# Patient Record
Sex: FEMALE | Employment: FULL TIME | URBAN - METROPOLITAN AREA
[De-identification: names, ages, dates, MRNs, and addresses within clinical notes are randomized per-mention and may not be internally consistent; named-entity substitution may affect disease eponyms.]

---

## 2017-05-08 ENCOUNTER — HOSPITAL ENCOUNTER (OUTPATIENT)
Dept: GENERAL RADIOLOGY | Age: 47
Discharge: HOME | End: 2017-05-08
Payer: COMMERCIAL

## 2017-05-08 DIAGNOSIS — R07.89 TIGHTNESS IN CHEST: ICD-10-CM

## 2017-05-08 PROCEDURE — 71020 XR CHEST PA LAT: CPT

## 2020-01-15 ENCOUNTER — HOSPITAL ENCOUNTER (OUTPATIENT)
Dept: PHYSICAL THERAPY | Age: 50
Discharge: HOME | End: 2020-01-15
Payer: COMMERCIAL

## 2020-01-15 PROCEDURE — 97112 NEUROMUSCULAR REEDUCATION: CPT | Performed by: PHYSICAL THERAPIST

## 2020-01-15 PROCEDURE — 97110 THERAPEUTIC EXERCISES: CPT | Performed by: PHYSICAL THERAPIST

## 2020-01-15 PROCEDURE — 97162 PT EVAL MOD COMPLEX 30 MIN: CPT | Performed by: PHYSICAL THERAPIST

## 2020-01-15 NOTE — PROGRESS NOTES
[address] [place] [address], [address]  Phone: [phone]  Fax: [phone]    Plan of Care/ Statement of Necessity for Physical Therapy Services 2-15    Patient name: Sonya Álvarez  : 1970  Provider#: [id number]  Referral source: Shyay Melton MD      Medical/Treatment Diagnosis: Pelvic floor dysfunction [M62.89]     Prior Hospitalization: see medical history     Comorbidities: see eval  Prior Level of Function: see eval  Medications: Verified on Patient Summary List    Start of Care: 1/15/20      Onset Date: 2 years       The Plan of Care and following information is based on the information from the initial evaluation. Assessment/ key information: Patient referred to pelvic PT for chronic constipation and AIMEE. She presents with overactivity of the PFM with poor coordination and motor awareness of PF and TrA. Patient instructed in the role of physical therapy in the evaluation and treatment of pelvic floor dysfunction, applicable treatment modalities discussed. Expectations for regular home exercise participation and expected visit frequency in to achieve the patient's goals were discussed. Patient instructed in pelvic floor anatomy and function including levator ani, puborectalis and superficial pelvic  musculature. Patient was provided dietary information to improve stool quality including increasing soluble fiber intake and increased water intake (6-8 glasses a day). Pt instructed in use of [place] stool chart to track progress. Patient provided information on bladder diary completion, will collect intake/measured output data for 72 hours and return for analysis. Patient educated in urinary urge suppression techniques including the KNACK, quick flicks, calmly walking rather than rushing to the toilet, nervous system down training.      Patient educated in proper defecation posture and technique including use of Squatty Potty for better puborectalis ms relaxation. Pt to avoid straining to pass stool in order to decrease strain on pelvic floor. Evaluation Complexity History MEDIUM  Complexity : 1-2 comorbidities / personal factors will impact the outcome/ POC ; Examination MEDIUM Complexity : 3 Standardized tests and measures addressing body structure, function, activity limitation and / or participation in recreation  ;Presentation MEDIUM Complexity : Evolving with changing characteristics  ; Clinical Decision Making MEDIUM Complexity : FOTO score of 26-74  Overall Complexity Rating: MEDIUM    Problem List: pain affecting function, decrease ROM, decrease strength, decrease ADL/ functional abilitiies, decrease activity tolerance and decrease flexibility/ joint mobility   Treatment Plan may include any combination of the following: Therapeutic exercise, Therapeutic activities, Neuromuscular re-education, Physical agent/modality, Manual therapy, Patient education, Self Care training and Functional mobility training  Patient / Family readiness to learn indicated by: asking questions, trying to perform skills and interest  Persons(s) to be included in education: patient (P)  Barriers to Learning/Limitations: None  Patient Goal (s): to reduce UI and constipation  Patient Self Reported Health Status: good  Rehabilitation Potential: good    Short Term Goals: To be accomplished in 6 weeks:  Patient will be independent with a progressive home exercise program    Patient will demonstrate & utilized pelvic floor ms protection techniques   Patient will demonstrate improved PFM strength to 3+/5 bilaterally in order to decrease UI symptoms   Patient will be independent with urge suppression techniques, bladder irritant elmination   Patient will complete a 72 hour bladder diary for analysis  Patient will be independent with progressive body scan relaxation program     Long Term Goals:  To be accomplished in 12 weeks:  Patient will demonstrate 4/5 PFM strength bilaterally in order to eliminate UI symptoms. Patient will demonstrate 10 second PFM holds at 4/5 in order to elminate UI symptoms. Patient will have no loss of urine with cough/sneeze. Patient will have no loss of urine with urge triggers (key in door, pulling pants down)  Patient will demonstrated improved transit time and complete emptying of rectum with BM    Frequency / Duration: Patient to be seen 1 times per week for 12 weeks. Patient/ Caregiver education and instruction: self care, activity modification and exercises    [x]  Plan of care has been reviewed with CLAUDIA Velazquez, PT 1/15/2020     ________________________________________________________________________    I certify that the above Therapy Services are being furnished while the patient is under my care. I agree with the treatment plan and certify that this therapy is necessary.     [employer] Signature:____________________  Date:____________Time: _________

## 2020-01-15 NOTE — PROGRESS NOTES
PT INITIAL EVALUATION NOTE -15    Patient Name: Km Baires  Date:1/15/2020  : 1970  [x]  Patient  Verified  Payor: BLUE CROSS / Plan: [employer] [id number] PPO / Product Type: PPO /    In time:0400  Out time:0500  Total Treatment Time (min): 60  Visit #: 1     Treatment Area: Pelvic floor dysfunction [M62.89]    SUBJECTIVE  Pain Level (0-10 scale): 0  Any medication changes, allergies to medications, adverse drug reactions, diagnosis change, or new procedure performed?: [] No    [x] Yes (see summary sheet for update)  Subjective:     Patient has been seeing Dr. Latisha Perez for chronic constipation/diarhear. She is taking Linzess and Miralax for constipation and it turned into more diarhea. Has d/c miralax. Tried to take an antibiotic for possible infection but did not tolerate well and d/c 1 week ago. Patient is now having a BM every other day with type 3 stool  She reports delayed and strong/servere urge for BM without FI. About 3 years ago, she was dx with DM (able to undo with diet and exercise). Breakfast: egg, meat, veggie. Lunch and Dinner: veggies and protein. Net carbs 90g. Drinking 40 oz. Her fiber intake is >30g and mostly insoluble. PMH: includes endometriosis with infiltration into bowels. Has had 2 laproscopic surgerys to treat and removal of 1 fallopian tube in past 10 years. Currently taking lo-estrogen BCP to prevent cycle and manage Sx. Patient reports that she has some urinary urgency and irritble bladder Sx. 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 Wears a panty liner which is wet by the end of the day.     PLOF: see above  Mechanism of Injury: gradual  Previous Treatment/Compliance: colonoscopy  PMHx/Surgical Hx: see above  Work Hx: special  K-[employer]  Living Situation: lives with   Pt Goals: to reduce constipation and UUI  Barriers: none  Motivation: yes  Substance use: none   FABQ Score: -  Cognition: A & O x 4 OBJECTIVE/EXAMINATION    External Pelvic Exam  Non tender through external pelvic clock  No POP at rest or with sustained valsalva  Active contraction: absent  Active relaxation: absent  Involuntary relaxation: absent    Internal Vaginal Exam:  Layer 1: wnl  Layer 2/3: wnl  Bladder neck mobility:wnl    PERFECT SCORE CHART  P =  Power (Laycock Scale Grade 0-5) 2/5  E =  Endurance (How long pt holds max contraction) 1sec  R =  Repetitions (How many times the repeats holds) 3x  F =  Fast Twitch (How many 1 second contractions in 10 seconds) unable to recruit in 10s  E =  Elevation (Lift of post vaginal wall toward pubic bone) absent  C =  Coordinated cocontraction of transverse abdominus absent  T = Timing (squeeze and lift with cough) absent    SEMG: resting tone: 8mV      15 min Therapeutic Exercise:  [x] See flow sheet :   Rationale: increase ROM, increase strength and improve coordination to improve the patients ability to perform ADLs       15 min Neuromuscular Re-education:  [x]  See flow sheet :sEMG biofeedback for pelvic awareness and down training   Rationale: increase ROM, increase strength, improve coordination and increase proprioception  to improve the patients ability to perform ADLs and reduce UI      With   [] TE   [] TA   [] neuro   [] other: Patient Education: [x] Review HEP    [] Progressed/Changed HEP based on:   [] positioning   [] body mechanics   [] transfers   [] heat/ice application    [] other:        Other Objective/Functional Measures:see above    Pain Level (0-10 scale) post treatment: 0      ASSESSMENT:      [x]  See Plan of Care      Ammy Chong, PT 1/15/2020

## 2020-02-05 ENCOUNTER — HOSPITAL ENCOUNTER (OUTPATIENT)
Dept: PHYSICAL THERAPY | Age: 50
Discharge: HOME | End: 2020-02-05
Payer: COMMERCIAL

## 2020-02-05 PROCEDURE — 97110 THERAPEUTIC EXERCISES: CPT | Performed by: PHYSICAL THERAPIST

## 2020-02-05 PROCEDURE — 97112 NEUROMUSCULAR REEDUCATION: CPT | Performed by: PHYSICAL THERAPIST

## 2020-02-05 NOTE — PROGRESS NOTES
PT DAILY TREATMENT NOTE 2-15    Patient Name: Zo Reyes  Date:2020  : 1970  [x]  Patient  Verified  Payor: BLUE CROSS / Plan: [employer] [id number] PPO / Product Type: PPO /    In time:0500  Out time:0600  Total Treatment Time (min): 60  Visit #: 2 Visit count could not be calculated. Make sure you are using a visit which is associated with an episode. Treatment Area: Pelvic floor dysfunction [M62.89]    SUBJECTIVE  Pain Level (0-10 scale): 0  Any medication changes, allergies to medications, adverse drug reactions, diagnosis change, or new procedure performed?: [x] No    [] Yes (see summary sheet for update)  Subjective functional status/changes:   [] No changes reported  BM have become less incidents of diarrhea. Having some constipation. Trying to figure out balance with fiber and water. Using squatty potty. Not drinking water.     OBJECTIVE          30 min Therapeutic Exercise:  [] See flow sheet :overflow, breathing   Rationale: increase ROM, increase strength and improve coordination to improve the patients ability to decrease urge         30 min Neuromuscular Re-education:  [x]  See flow sheet :sEMG, elevators, quicks, bulging, coodination drills   Rationale: increase ROM, increase strength and improve coordination  to improve the patients ability to decrease urge and improve emptying      With   [] TE   [] TA   [] neuro   [] other: Patient Education: [x] Review HEP    [] Progressed/Changed HEP based on:   [] positioning   [] body mechanics   [] transfers   [] heat/ice application    [] other:      Other Objective/Functional Measures: Resting tone 5-6mV initial    During Tx; <5mV rest, 18mV max  Demonstrates improved recruitment and derecruitment with Verbal and Visual Cues       Pain Level (0-10 scale) post treatment: 0    ASSESSMENT/Changes in Function:     Patient will continue to benefit from skilled PT services to modify and progress therapeutic interventions, address functional mobility deficits, address ROM deficits, address strength deficits, analyze and address soft tissue restrictions, analyze and cue movement patterns and analyze and modify body mechanics/ergonomics to attain remaining goals. []  See Plan of Care  []  See progress note/recertification  []  See Discharge Summary         Progress towards goals / Updated goals:  Demonstrates good potential to meet all goals.     PLAN  [x]  Upgrade activities as tolerated     []  Continue plan of care  []  Update interventions per flow sheet       []  Discharge due to:_  []  Other:_      Khanh Deras, PT 2/5/2020

## 2020-02-12 ENCOUNTER — APPOINTMENT (OUTPATIENT)
Dept: PHYSICAL THERAPY | Age: 50
End: 2020-02-12
Payer: COMMERCIAL

## 2020-02-19 ENCOUNTER — HOSPITAL ENCOUNTER (OUTPATIENT)
Dept: PHYSICAL THERAPY | Age: 50
Discharge: HOME | End: 2020-02-19
Payer: COMMERCIAL

## 2020-02-19 PROCEDURE — 97110 THERAPEUTIC EXERCISES: CPT | Performed by: PHYSICAL THERAPIST

## 2020-02-19 PROCEDURE — 97112 NEUROMUSCULAR REEDUCATION: CPT | Performed by: PHYSICAL THERAPIST

## 2020-02-19 NOTE — PROGRESS NOTES
PT DAILY TREATMENT NOTE 2-15    Patient Name: Tracee Lyn  Date:2020  : 1970  [x]  Patient  Verified  Payor: BLUE CROSS / Plan: [employer] [id number] PPO / Product Type: PPO /    In time:0500  Out time:0600  Total Treatment Time (min): 60  Visit #: 3  Treatment Area: Pelvic floor dysfunction [M62.89]    SUBJECTIVE  Pain Level (0-10 scale): 0  Any medication changes, allergies to medications, adverse drug reactions, diagnosis change, or new procedure performed?: [x] No    [] Yes (see summary sheet for update)  Subjective functional status/changes:   [] No changes reported  Patient reports that she is still having trouble evacuating stool because she has the urge to go at school, and does not have access to squatty potty. She is having better bowel movement stool consistency (less diarrhea).      OBJECTIVE          30 min Therapeutic Exercise:  [] See flow sheet :overflow, breathing   Rationale: increase ROM, increase strength and improve coordination to improve the patients ability to decrease urge         30 min Neuromuscular Re-education:  [x]  See flow sheet :sEMG, elevators, quicks, bulging, coodination drills   Rationale: increase ROM, increase strength and improve coordination  to improve the patients ability to decrease urge and improve emptying      With   [] TE   [] TA   [] neuro   [] other: Patient Education: [x] Review HEP    [] Progressed/Changed HEP based on:   [] positioning   [] body mechanics   [] transfers   [] heat/ice application    [] other:      Other Objective/Functional Measures: Resting tone 3-5mV (normal)    During Tx; max: 15mV  Demonstrates improved recruitment and derecruitment with Verbal and Visual Cues, progressed with TrA stability for improved Sx management for Endo       Pain Level (0-10 scale) post treatment: 0    ASSESSMENT/Changes in Function:     Patient will continue to benefit from skilled PT services to modify and progress therapeutic interventions, address functional mobility deficits, address ROM deficits, address strength deficits, analyze and address soft tissue restrictions, analyze and cue movement patterns and analyze and modify body mechanics/ergonomics to attain remaining goals. []  See Plan of Care  []  See progress note/recertification  []  See Discharge Summary         Progress towards goals / Updated goals:  Demonstrates good potential to meet all goals.     PLAN  [x]  Upgrade activities as tolerated     []  Continue plan of care  []  Update interventions per flow sheet       []  Discharge due to:_  []  Other:_      William Snell, PT 2/19/2020

## 2020-02-26 ENCOUNTER — HOSPITAL ENCOUNTER (OUTPATIENT)
Dept: PHYSICAL THERAPY | Age: 50
Discharge: HOME | End: 2020-02-26
Payer: COMMERCIAL

## 2020-02-26 PROCEDURE — 97112 NEUROMUSCULAR REEDUCATION: CPT | Performed by: PHYSICAL THERAPIST

## 2020-02-26 PROCEDURE — 97110 THERAPEUTIC EXERCISES: CPT | Performed by: PHYSICAL THERAPIST

## 2020-02-26 NOTE — PROGRESS NOTES
PT DAILY TREATMENT NOTE 2-15    Patient Name: Julian Began  Date:2020  : 1970  [x]  Patient  Verified  Payor: BLUE SHARONA / Plan: [employer] [id number] PPO / Product Type: PPO /    In time:0500  Out time:0600  Total Treatment Time (min): 60  Visit #: 4  Treatment Area: Pelvic floor dysfunction [M62.89]    SUBJECTIVE  Pain Level (0-10 scale): 0  Any medication changes, allergies to medications, adverse drug reactions, diagnosis change, or new procedure performed?: [x] No    [] Yes (see summary sheet for update)  Subjective functional status/changes:   [] No changes reported  Patient reports that she added cheese back into diet and has not been increasing soluble fiber. She is having more frequent BM's and loose stool. Feels gassy and irritable. Even feels like she might be getting a UTI. OBJECTIVE          30 min Therapeutic Exercise:  [] See flow sheet :overflow, breathing   Rationale: increase ROM, increase strength and improve coordination to improve the patients ability to decrease urge         30 min Neuromuscular Re-education:  [x]  See flow sheet :sEMG, elevators, quicks, bulging, coodination drills   Rationale: increase ROM, increase strength and improve coordination  to improve the patients ability to decrease urge and improve emptying      With   [] TE   [] TA   [] neuro   [] other: Patient Education: [x] Review HEP    [] Progressed/Changed HEP based on:   [] positioning   [] body mechanics   [] transfers   [] heat/ice application    [] other:      Other Objective/Functional Measures: Resting tone 3-5mV (normal)    Patient demonstrates difficulty recruiting levator ani today without overflow technique. Max: 9mv       Pain Level (0-10 scale) post treatment: 0    ASSESSMENT/Changes in Function:    discussed importance of soluble fiber and stool quality. Also reiterated importance of drinking water and allowing time to have BM and not to ignore BM.       Patient will continue to benefit from skilled PT services to modify and progress therapeutic interventions, address functional mobility deficits, address ROM deficits, address strength deficits, analyze and address soft tissue restrictions, analyze and cue movement patterns and analyze and modify body mechanics/ergonomics to attain remaining goals. []  See Plan of Care  []  See progress note/recertification  []  See Discharge Summary         Progress towards goals / Updated goals:  Demonstrates good potential to meet all goals.     PLAN  [x]  Upgrade activities as tolerated     []  Continue plan of care  []  Update interventions per flow sheet       []  Discharge due to:_  []  Other:_      Gray Oconnor, PT 2/26/2020

## 2020-03-04 ENCOUNTER — HOSPITAL ENCOUNTER (OUTPATIENT)
Dept: PHYSICAL THERAPY | Age: 50
Discharge: HOME | End: 2020-03-04
Payer: COMMERCIAL

## 2020-03-04 PROCEDURE — 97112 NEUROMUSCULAR REEDUCATION: CPT | Performed by: PHYSICAL THERAPIST

## 2020-03-04 PROCEDURE — 97110 THERAPEUTIC EXERCISES: CPT | Performed by: PHYSICAL THERAPIST

## 2020-03-04 NOTE — PROGRESS NOTES
PT DAILY TREATMENT NOTE 2-15    Patient Name: Yaz Martin  Date:3/4/2020  : 1970  [x]  Patient  Verified  Payor: BLUE CROSS / Plan: [facility] PPO / Product Type: PPO /    In time:0600  Out time:0700  Total Treatment Time (min): 60  Visit #: 5  Treatment Area: Pelvic floor dysfunction [M62.89]    SUBJECTIVE  Pain Level (0-10 scale): 0  Any medication changes, allergies to medications, adverse drug reactions, diagnosis change, or new procedure performed?: [x] No    [] Yes (see summary sheet for update)  Subjective functional status/changes:   [] No changes reported  Patient reports that has taken cheese out of her diet, but not sure if it even makes a difference. Still having inconsistent BMs. Even when they are soft and formed, she reports pain from anal fissure. She admits that she still breath holds to quickly evacuate due to pain. Using squatty potty. OBJECTIVE          30 min Therapeutic Exercise:  [] See flow sheet :overflow, breathing   Rationale: increase ROM, increase strength and improve coordination to improve the patients ability to decrease urge         30 min Neuromuscular Re-education:  [x]  See flow sheet :sEMG, elevators, quicks, bulging, coodination drills   Rationale: increase ROM, increase strength and improve coordination  to improve the patients ability to decrease urge and improve emptying      With   [] TE   [] TA   [] neuro   [] other: Patient Education: [x] Review HEP    [] Progressed/Changed HEP based on:   [] positioning   [] body mechanics   [] transfers   [] heat/ice application    [] other:      Other Objective/Functional Measures:   SEMG:3-5mV  Improved recruitment: Max 15mV    Focused on downtraining and breathing for painfree BM       Pain Level (0-10 scale) post treatment: 0    ASSESSMENT/Changes in Function:   Discussed the possibilities of scar adhesions in bowels secondary to endometriosis.   Patient will continue to benefit from abdominal mobilization, biofeedback and down training to decrease pain leading to her constipation. Patient agreed to add more water in her diet now that she has made improvements in her PFM coordination and can successfully perform urge suppression. Patient will continue to benefit from skilled PT services to modify and progress therapeutic interventions, address functional mobility deficits, address ROM deficits, address strength deficits, analyze and address soft tissue restrictions, analyze and cue movement patterns and analyze and modify body mechanics/ergonomics to attain remaining goals. []  See Plan of Care  []  See progress note/recertification  []  See Discharge Summary         Progress towards goals / Updated goals:  Demonstrates good potential to meet all goals.     PLAN  [x]  Upgrade activities as tolerated     []  Continue plan of care  []  Update interventions per flow sheet       []  Discharge due to:_  []  Other:_      Lucia Ko, PT 3/4/2020

## 2020-03-11 ENCOUNTER — HOSPITAL ENCOUNTER (OUTPATIENT)
Dept: PHYSICAL THERAPY | Age: 50
Discharge: HOME | End: 2020-03-11
Payer: COMMERCIAL

## 2020-03-11 PROCEDURE — 97110 THERAPEUTIC EXERCISES: CPT | Performed by: PHYSICAL THERAPIST

## 2020-03-11 PROCEDURE — 97140 MANUAL THERAPY 1/> REGIONS: CPT | Performed by: PHYSICAL THERAPIST

## 2020-03-11 PROCEDURE — 97112 NEUROMUSCULAR REEDUCATION: CPT | Performed by: PHYSICAL THERAPIST

## 2020-03-11 NOTE — PROGRESS NOTES
PT DAILY TREATMENT NOTE 2-15    Patient Name: Sonya Álvarez  Date:3/11/2020  : 1970  [x]  Patient  Verified  Payor: BLUE CROSS / Plan: [employer] [id number] PPO / Product Type: PPO /    In time:0400  Out time:0500  Total Treatment Time (min): 60  Visit #: 6  Treatment Area: Pelvic floor dysfunction [M62.89]    SUBJECTIVE  Pain Level (0-10 scale): 0  Any medication changes, allergies to medications, adverse drug reactions, diagnosis change, or new procedure performed?: [x] No    [] Yes (see summary sheet for update)  Subjective functional status/changes:   [] No changes reported    Patient reports that her symptoms have stayed steady over the past week. Still struggles to initiate BM even when type 4 stool and reports that she feels the need to breath hold and push. OBJECTIVE          30 min Therapeutic Exercise:  [] See flow sheet :overflow, breathing, with manaul OP for rib excursion and visceral mobility   Rationale: increase ROM, increase strength and improve coordination to improve the patients ability to decrease urge         30 min Neuromuscular Re-education:  [x]  See flow sheet :TrA progression   Rationale: increase ROM, increase strength and improve coordination  to improve the patients ability to decrease urge and improve emptying      With   [] TE   [] TA   [] neuro   [] other: Patient Education: [x] Review HEP    [] Progressed/Changed HEP based on:   [] positioning   [] body mechanics   [] transfers   [] heat/ice application    [] other:      Other Objective/Functional Measures:   Reviewed toileting techniques with breath mechanics and bulging. Tolerated visceral mobilization well today. Pain Level (0-10 scale) post treatment: 0    ASSESSMENT/Changes in Function:     Reviewed lumbosacral stabilization well today. Requires manual cues for correct breathing and rib excursion.      Patient will continue to benefit from skilled PT services to modify and progress therapeutic interventions, address functional mobility deficits, address ROM deficits, address strength deficits, analyze and address soft tissue restrictions, analyze and cue movement patterns and analyze and modify body mechanics/ergonomics to attain remaining goals. []  See Plan of Care  []  See progress note/recertification  []  See Discharge Summary         Progress towards goals / Updated goals:  Demonstrates good potential to meet all goals.     PLAN  [x]  Upgrade activities as tolerated     []  Continue plan of care  []  Update interventions per flow sheet       []  Discharge due to:_  []  Other:_      Ammy Smoke, PT 3/11/2020

## 2020-03-18 ENCOUNTER — HOSPITAL ENCOUNTER (OUTPATIENT)
Dept: PHYSICAL THERAPY | Age: 50
Discharge: HOME | End: 2020-03-18
Payer: COMMERCIAL

## 2020-03-18 PROCEDURE — 97014 ELECTRIC STIMULATION THERAPY: CPT | Performed by: PHYSICAL THERAPIST

## 2020-03-18 PROCEDURE — 97112 NEUROMUSCULAR REEDUCATION: CPT | Performed by: PHYSICAL THERAPIST

## 2020-03-18 PROCEDURE — 97110 THERAPEUTIC EXERCISES: CPT | Performed by: PHYSICAL THERAPIST

## 2020-03-18 NOTE — PROGRESS NOTES
PT DAILY TREATMENT NOTE 2-15    Patient Name: Julian Began  Date:3/18/2020  : 1970  [x]  Patient  Verified  Payor: BLUE CROSS / Plan: [facility] PPO / Product Type: PPO /    In time:0400  Out time:0500  Total Treatment Time (min): 60  Visit #: 7  Treatment Area: Pelvic floor dysfunction [M62.89]    SUBJECTIVE  Pain Level (0-10 scale): 0  Any medication changes, allergies to medications, adverse drug reactions, diagnosis change, or new procedure performed?: [x] No    [] Yes (see summary sheet for update)  Subjective functional status/changes:   [] No changes reported    Patient reports that she is going to try to drink more water over the next month and practice urge suppression since she will be out of work    OBJECTIVE      Modality rationale: decrease inflammation, decrease pain and increase tissue extensibility to improve the patients ability to perform ADls   Min Type Additional Details   15 [x] Estim: []Att   []Unatt        []TENS instruct                  [x]IFC  []Premod   []NMES                     []Other:  []w/US   []w/ice   []w/heat  Position:supine  Location:abdomen    []  Traction: [] Cervical       []Lumbar                       [] Prone          []Supine                       []Intermittent   []Continuous Lbs:  [] before manual  [] after manual  []w/heat    []  Ultrasound: []Continuous   [] Pulsed at:                            []1MHz   []3MHz Location:  W/cm2:    []  Paraffin         Location:  []w/heat    []  Ice     []  Heat  []  Ice massage Position:  Location:    []  Laser  []  Other: Position:  Location:    []  Vasopneumatic Device Pressure:       [] lo [] med [] hi   Temperature:    [x] Skin assessment post-treatment:  [x]intact []redness- no adverse reaction    []redness  adverse reaction:     30 min Therapeutic Exercise:  [] See flow sheet :overflow, breathing, with manaul OP for rib excursion and visceral mobility   Rationale: increase ROM, increase strength and improve coordination to improve the patients ability to decrease urge         15 min Neuromuscular Re-education:  [x]  See flow sheet :TrA progression   Rationale: increase ROM, increase strength and improve coordination  to improve the patients ability to decrease urge and improve emptying      With   [] TE   [] TA   [] neuro   [] other: Patient Education: [x] Review HEP    [] Progressed/Changed HEP based on:   [] positioning   [] body mechanics   [] transfers   [] heat/ice application    [] other:      Other Objective/Functional Measures:   Improved lerated visceral mobilization and TENS well today. Pain Level (0-10 scale) post treatment: 0    ASSESSMENT/Changes in Function:     Reviewed lumbosacral stabilization well today. Requires manual cues for correct breathing and rib excursion. Patient will continue to benefit from skilled PT services to modify and progress therapeutic interventions, address functional mobility deficits, address ROM deficits, address strength deficits, analyze and address soft tissue restrictions, analyze and cue movement patterns and analyze and modify body mechanics/ergonomics to attain remaining goals. []  See Plan of Care    []  See progress note/recertification  []  See Discharge Summary         Progress towards goals / Updated goals:  Demonstrates good potential to meet all goals.     PLAN  [x]  Upgrade activities as tolerated     []  Continue plan of care  []  Update interventions per flow sheet       []  Discharge due to:_  []  Other:_      Lulu Velazquez, PT 3/18/2020

## 2020-03-25 ENCOUNTER — APPOINTMENT (OUTPATIENT)
Dept: PHYSICAL THERAPY | Age: 50
End: 2020-03-25
Payer: COMMERCIAL

## 2020-03-31 ENCOUNTER — APPOINTMENT (OUTPATIENT)
Dept: PHYSICAL THERAPY | Age: 50
End: 2020-03-31
Payer: COMMERCIAL

## 2020-04-08 ENCOUNTER — APPOINTMENT (OUTPATIENT)
Dept: PHYSICAL THERAPY | Age: 50
End: 2020-04-08

## 2020-07-21 NOTE — PROGRESS NOTES
[address]  [address] [facility] [address]  Phone: [phone]  Fax: [phone]    Discharge Summary  2-15    Patient name: Sabrina Baig  : 1970  Provider#: [id number]  Referral source: Kristyn Wilkinson MD      Medical/Treatment Diagnosis: Pelvic floor dysfunction [M62.89]     Prior Hospitalization: see medical history     Comorbidities: See Plan of Care  Prior Level of Function:See Plan of Care  Medications: Verified on Patient Summary List    Start of Care: 1/15/20      Onset Date:2+ years   Visits from Start of Care: 7     Missed Visits: 0  Reporting Period : 1/15/20 to 3/18/20      ASSESSMENT/SUMMARY OF CARE: Patient seen for 7 visits for pelvic floor dysfunction. She was provided with HEP for Sx management. Due to COVID-19 restrictions she did not return to the clinic, thus unable to assess goal at this time.           RECOMMENDATIONS:  [x]Discontinue therapy: [x]Patient has reached or is progressing toward set goals      []Patient is non-compliant or has abdicated      []Due to lack of appreciable progress towards set goals      [x]Other    Peter Muir, PT 2020

## 2022-02-08 ENCOUNTER — TRANSCRIBE ORDER (OUTPATIENT)
Dept: GENERAL RADIOLOGY | Age: 52
End: 2022-02-08

## 2022-02-08 ENCOUNTER — HOSPITAL ENCOUNTER (OUTPATIENT)
Dept: GENERAL RADIOLOGY | Age: 52
Discharge: HOME | End: 2022-02-08

## 2022-02-08 DIAGNOSIS — M79.643 HAND PAIN: ICD-10-CM

## 2022-02-08 DIAGNOSIS — M79.643 HAND PAIN: Primary | ICD-10-CM

## 2023-04-18 ENCOUNTER — TRANSCRIBE ORDER (OUTPATIENT)
Dept: SCHEDULING | Age: 53
End: 2023-04-18

## 2023-04-18 ENCOUNTER — HOSPITAL ENCOUNTER (OUTPATIENT)
Dept: CT IMAGING | Age: 53
Discharge: HOME | End: 2023-04-18
Payer: COMMERCIAL

## 2023-04-18 DIAGNOSIS — R10.32 LEFT LOWER QUADRANT PAIN: ICD-10-CM

## 2023-04-18 DIAGNOSIS — R10.31 RIGHT LOWER QUADRANT PAIN: ICD-10-CM

## 2023-04-18 DIAGNOSIS — K62.5 ANAL BLEEDING: Primary | ICD-10-CM

## 2023-04-18 DIAGNOSIS — K62.5 ANAL BLEEDING: ICD-10-CM

## 2023-04-18 PROCEDURE — 74011000636 HC RX REV CODE- 636: Performed by: RADIOLOGY

## 2023-04-18 PROCEDURE — 74177 CT ABD & PELVIS W/CONTRAST: CPT

## 2023-04-18 RX ADMIN — IOHEXOL 100 ML: 350 INJECTION, SOLUTION INTRAVENOUS at 15:09

## 2023-05-11 RX ORDER — ATORVASTATIN CALCIUM 10 MG/1
1 TABLET, FILM COATED ORAL DAILY
COMMUNITY
Start: 2016-11-10

## 2023-05-11 RX ORDER — NORETHINDRONE ACETATE AND ETHINYL ESTRADIOL 1; .02 MG/1; MG/1
1 TABLET ORAL DAILY
COMMUNITY

## 2023-05-11 RX ORDER — LISINOPRIL 10 MG/1
1 TABLET ORAL DAILY
COMMUNITY
Start: 2016-11-10

## 2023-05-11 RX ORDER — FLUTICASONE PROPIONATE 50 MCG
2 SPRAY, SUSPENSION (ML) NASAL DAILY
COMMUNITY

## 2023-05-11 RX ORDER — ASPIRIN 81 MG/1
TABLET ORAL DAILY
COMMUNITY